# Patient Record
(demographics unavailable — no encounter records)

---

## 2024-10-22 NOTE — HEALTH RISK ASSESSMENT
[Good] : ~his/her~  mood as  good [2 - 4 times a month (2 pts)] : 2-4 times a month (2 points) [1 or 2 (0 pts)] : 1 or 2 (0 points) [Never (0 pts)] : Never (0 points) [No] : In the past 12 months have you used drugs other than those required for medical reasons? No [No falls in past year] : Patient reported no falls in the past year [0] : 2) Feeling down, depressed, or hopeless: Not at all (0) [PHQ-2 Negative - No further assessment needed] : PHQ-2 Negative - No further assessment needed [Audit-CScore] : 0 [de-identified] : excercise 3x/ week [de-identified] : no particular diet [[id number]] : 0 [Never] : Never [HIV test declined] : HIV test declined [Hepatitis C test declined] : Hepatitis C test declined [Change in mental status noted] : No change in mental status noted [Language] : denies difficulty with language [Behavior] : denies difficulty with behavior [Learning/Retaining New Information] : denies difficulty learning/retaining new information [Handling Complex Tasks] : denies difficulty handling complex tasks [Reasoning] : denies difficulty with reasoning [Spatial Ability and Orientation] : denies difficulty with spatial ability and orientation [None] : None [Alone] : lives alone [Single] : single [Sexually Active] : not sexually active [High Risk Behavior] : no high risk behavior [Feels Safe at Home] : Feels safe at home [Fully functional (bathing, dressing, toileting, transferring, walking, feeding)] : Fully functional (bathing, dressing, toileting, transferring, walking, feeding) [Fully functional (using the telephone, shopping, preparing meals, housekeeping, doing laundry, using] : Fully functional and needs no help or supervision to perform IADLs (using the telephone, shopping, preparing meals, housekeeping, doing laundry, using transportation, managing medications and managing finances) [Reports changes in hearing] : Reports no changes in hearing [Reports changes in vision] : Reports no changes in vision [Reports normal functional visual acuity (ie: able to read med bottle)] : Reports poor functional visual acuity.  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

## 2024-10-22 NOTE — END OF VISIT
[Time Spent: ___ minutes] : I have spent [unfilled] minutes of time on the encounter which excludes teaching and separately reported services. [TextEntry] :  Time Spent: Reviwing chart  prior to encounter , reviewing history, social hx , exam  20  mins Educating patient on diet , excercise and needs for  meds 15 mins Final charting, sending scripts, delating with MA/office 10 mins

## 2024-10-22 NOTE — REVIEW OF SYSTEMS
[Patient Intake Form Reviewed] : Patient intake form was reviewed [FreeTextEntry1] : ROS negative other then what is inHPI

## 2024-10-22 NOTE — PHYSICAL EXAM
[TextEntry] :  HEENT:   throat is clear, no exudate or erythma; ear canal is clean, no drainage. TM does look mildly foggy, but intact.   NECK:   supple, no carotid bruits, no palpable lymph nodes, no thyromegaly  CV:  +S1, +S2, regular, no murmurs or rubs  RESP:   lungs clear to auscultation bilaterally, no wheezing, rales, rhonchi, good air entry bilaterally  BREAST:  not examined  GI:  abdomen soft, non-tender, non-distended, normal BS, no bruits, no abdominal masses, no palpable masses  RECTAL:  not examined  :  not examined  MSK:   normal muscle tone, no atrophy, no rigidity, no contractions  EXT:   no clubbing, no cyanosis, no edema, no calf pain, swelling or erythema  VASCULAR:  pulses equal and symmetric in the upper and lower extremities  NEURO:  AAOX3, no focal neurological deficits, follows all commands, able to move extremities spontaneously  SKIN:  no ulcers, lesions or rashes

## 2024-10-22 NOTE — HISTORY OF PRESENT ILLNESS
[FreeTextEntry1] : annual visit [de-identified] : 29 year old female w/ no sig PMH presents for her annual visit . Patient had recent URI 3-4 weeks ago , had excessive coughing with productive yellow sputum. She says yellow sputum now decreased, but feels like there is something in chest, that she cant seem to cough up, and associated with ongoing fatigue.  Denies any drainage from nose. Denies any associated fevers.   Complaints of Ear fullness. says she feels her ear popping. ongoing for one week.   Feels like she cant focus when at home on any particular task. wants to be evaluated for adhd. Feels like shes also having some difficulty sleeping.  Also with cold sores that have come and gone for past one week. currently with cold sores on upper lip.

## 2024-10-22 NOTE — PLAN
[FreeTextEntry1] : 29 year old female, recent URI 3-4 weeks ago , had excessive coughing with productive yellow sputum. She says yellow sputum now decreased, but feels like there is something in chest, that she cant seem to cough up, and associated with ongoing fatigue.  Also c/o ear fullness, oral lesions.   Fatigue , recent URI -feels like something in chest cant seem to cough up -rule out Atypical PNA, will send for chest xray  Complaints of Ear fullness. says she feels her ear popping. ongoing for one week.  -on exam, there is no erythema, no dishcharge. Tympanic membrane is intact. There is some fogginess around the TM. possible Otitis media?  -will send Augmentin since patient is having symptoms "ear fullness"  Feels like she cant focus when at home on any particular task. wants to be evaluated for adhd. Feels like she's also having some difficulty sleeping. -will send pyschiatry referral -will send Hydroxyzine  Also with cold sores that have come and gone for past one week. currently with cold sores on upper lip.  -will send Valcyclovir   Annual blood work: cbc, cmp, lipid, a1c, thyroid; Patient refused urine test.

## 2025-03-16 NOTE — HISTORY OF PRESENT ILLNESS
[Home] : at home, [unfilled] , at the time of the visit. [Medical Office: ([facility] [place])___] : at the medical office located in  [Telehealth (audio & video)] : This visit was provided via telehealth using real-time 2-way audio visual technology. [Verbal consent obtained from patient] : the patient, [unfilled] [FreeTextEntry1] : follow up regarding HLD  [de-identified] : Follow up regarding HLD 5 months post diagnosis. Previously educated on diet and excercise.  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 Her diet has also not been optimal. No significant changes made to diet. All other ros is negative.

## 2025-03-16 NOTE — PLAN
[FreeTextEntry1] :    Follow up regarding HLD 5 months post diagnosis. Previously educated on diet and excercise.  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 Her diet has also not been optimal. No significant changes made to diet. All other ros is negative.    HLD >>LDL basically unchanged. >> No family hx as per patient for premature CAD or cardiac death.  >> re-iterated diet plan for patient   Your cholesterol is very high.   Id like you to start with a aggressive diet which includes  >> Low cholesterol (no fried foods, no more then 2 tablespoons of any kind of oil in cooking foods, no egg yolk, limit cheese)  >> low carbohydate  as well.   >>  increase your exercise if possible  >>decrease your overall caloric intake; eat more proteins then anything else.   Lets repeat your blood work in 5 months.